# Patient Record
Sex: MALE | Race: BLACK OR AFRICAN AMERICAN | NOT HISPANIC OR LATINO | Employment: UNEMPLOYED | URBAN - METROPOLITAN AREA
[De-identification: names, ages, dates, MRNs, and addresses within clinical notes are randomized per-mention and may not be internally consistent; named-entity substitution may affect disease eponyms.]

---

## 2018-03-22 ENCOUNTER — HOSPITAL ENCOUNTER (EMERGENCY)
Facility: MEDICAL CENTER | Age: 38
End: 2018-03-22
Attending: EMERGENCY MEDICINE

## 2018-03-22 ENCOUNTER — APPOINTMENT (OUTPATIENT)
Dept: RADIOLOGY | Facility: MEDICAL CENTER | Age: 38
End: 2018-03-22
Attending: EMERGENCY MEDICINE

## 2018-03-22 VITALS
HEIGHT: 73 IN | SYSTOLIC BLOOD PRESSURE: 126 MMHG | TEMPERATURE: 97.3 F | DIASTOLIC BLOOD PRESSURE: 86 MMHG | OXYGEN SATURATION: 93 % | WEIGHT: 161.6 LBS | HEART RATE: 85 BPM | BODY MASS INDEX: 21.42 KG/M2 | RESPIRATION RATE: 18 BRPM

## 2018-03-22 DIAGNOSIS — J18.9 PNEUMONIA OF LEFT LOWER LOBE DUE TO INFECTIOUS ORGANISM: ICD-10-CM

## 2018-03-22 PROCEDURE — 71046 X-RAY EXAM CHEST 2 VIEWS: CPT

## 2018-03-22 PROCEDURE — 94760 N-INVAS EAR/PLS OXIMETRY 1: CPT

## 2018-03-22 PROCEDURE — 700101 HCHG RX REV CODE 250: Performed by: EMERGENCY MEDICINE

## 2018-03-22 PROCEDURE — 94640 AIRWAY INHALATION TREATMENT: CPT

## 2018-03-22 PROCEDURE — 99284 EMERGENCY DEPT VISIT MOD MDM: CPT

## 2018-03-22 RX ORDER — ALBUTEROL SULFATE 90 UG/1
2 AEROSOL, METERED RESPIRATORY (INHALATION) EVERY 6 HOURS PRN
Qty: 8.5 G | Refills: 0 | Status: SHIPPED | OUTPATIENT
Start: 2018-03-22

## 2018-03-22 RX ORDER — AZITHROMYCIN 250 MG/1
TABLET, FILM COATED ORAL
Qty: 6 TAB | Refills: 0 | Status: SHIPPED | OUTPATIENT
Start: 2018-03-22

## 2018-03-22 RX ORDER — PREDNISONE 20 MG/1
40 TABLET ORAL DAILY
Qty: 10 TAB | Refills: 0 | Status: SHIPPED | OUTPATIENT
Start: 2018-03-22 | End: 2018-03-27

## 2018-03-22 RX ADMIN — ALBUTEROL SULFATE 2.5 MG: 2.5 SOLUTION RESPIRATORY (INHALATION) at 20:11

## 2018-03-23 NOTE — ED PROVIDER NOTES
ED Provider Note     Scribed for Jessica Zuluaga M.D. by Duyen Joy. 3/22/2018, 7:20 PM.    Primary Care Provider: None noted  Means of arrival: Walk in  History obtained from: Patient  History limited by: None     CHIEF COMPLAINT  Chief Complaint   Patient presents with   • Cold Symptoms     x 2 weeks    • Cough     productive       HPI  Ivon Velasquez is a 38 y.o. male who presents to the Emergency Department for a lingering cough onset 2 weeks ago. He reports associated shortness of breath and generalized muscle aches. The patient states his cough is exacerbated at night. He has experienced one episode of post-tussive emesis 2 nights ago. The patient additionally endorses sinus pressure and a minor sinus headache. He states 2 weeks ago he also had generalized body aches, chills, and fever, however symptoms have resolved at this time. The patient has been taking Nyquil with no relief. He reports a prior history of pneumonia and bronchitis. The patient denies nausea, fever, or abdominal pain.     REVIEW OF SYSTEMS  Pertinent positives include cough, shortness of breath, generalized muscle aches, sinus pressure, headache, and post-tussive emesis. He does have impaired immunity secondary to cigarette use, he does have current tobacco abuse Pertinent negatives include no nausea, fever, abdominal pain, generalized body aches, chills, or fever. See HPI for further details.   E.    PAST MEDICAL HISTORY   has a past medical history of Bronchitis and Pneumonia.    SOCIAL HISTORY  Social History   Substance Use Topics   • Smoking status: Current Some Day Smoker     Types: Cigarettes   • Smokeless tobacco: Never Used   • Alcohol use Yes      Comment: daily      History   Drug Use   • Types: Inhaled     Comment: cannibis        SURGICAL HISTORY  patient denies any surgical history     CURRENT MEDICATIONS  Home Medications    **Home medications have not yet been reviewed for this encounter**         ALLERGIES  No  "Known Allergies    PHYSICAL EXAM  VITAL SIGNS: /79   Pulse (!) 104   Temp 36.3 °C (97.3 °F)   Resp 18   Ht 1.854 m (6' 1\")   Wt 73.3 kg (161 lb 9.6 oz)   SpO2 95%   BMI 21.32 kg/m²   Constitutional: Alert, no acute distress  HENT: Normocephalic, atraumatic, moist mucus membranes. Posterior oropharynx clear.   Eyes: Pupils equal and reactive, normal conjunctiva, non-icteric  Neck: Supple, normal range of motion, no stridor  Cardiovascular: Regular rhythm, Normal peripheral perfusion, no cyanosis, Normal cardiac auscultation  Pulmonary: Moderate diffuse wheezing throughout all lung fields. No accessory muscle usage.  Abdomen: Soft, non tender, no peritoneal signs, bowel sounds are present.   Skin: Warm, dry, no rashes or lesions  Back: No pain with active range of motion  Musculoskeletal: Normal range of motion in all extremities, no swelling or deformity noted  Neurologic: Alert, oriented, normal motor function, no speech deficits  Psychiatric: Normal and appropriate mood and affect    Radiology results show:   DX-CHEST-2 VIEWS   Final Result      Minimal and somewhat equivocal lingular opacities, suggesting early pneumonia in that area.               INTERPRETING LOCATION: [address] [place] [address], [facility]          COURSE & MEDICAL DECISION MAKING  Nursing notes, VS, PMSFHx reviewed in chart.    No recent medical records available for review    Differential diagnoses include but are not limited to: Pneumonia, reactive airway disease, post viral cough    7:20 PM - Patient seen and examined at bedside. Patient will be treated with Proventil 2.5 mg. Ordered DX-Chest to evaluate his symptoms.     Decision Making:  This is a 38 y.o. year old who presents with persisting cough after flulike illness. On arrival to the emergency department he is well appearing, room air oxygen saturation is 95%. On pulmonary exam he does have moderate wheezing throughout all lung fields. He is a daily smoker. He is afebrile on " arrival to the emergency department.    Symptoms are consistent with post viral cough. His wheezing was treated with albuterol in the emergency department. On reassessment wheezing has improved, though still present and mild. As his symptoms have been persistent, I did order a chest x-ray for further evaluation. This demonstrates possible early pneumonia in the lingula. Chronic changes present consistent with COPD.    Plan at this time is for treatment with azithromycin, albuterol inhaler as well as a short course of steroids due to his persistent wheezing and chronic changes on chest x-ray.    He will be discharged home with close primary care follow-up. He will contact his physician tomorrow morning for complete recheck. Return precautions given including worsening shortness of breath, development of fevers, persistent cough that does not improve with albuterol, or any further concerns. He should follow-up within 24-48 hours for breathing rechecked, with his primary care physician, emergency department, or urgent care.    Discharge home in stable condition    FINAL IMPRESSION  1. Pneumonia of left lower lobe due to infectious organism (CMS-[facility])          Duyen HENLEY (Bruce), am scribing for, and in the presence of, Jessica Zuluaga M.D..    Electronically signed by: Duyen Joy (Bruce), 3/22/2018    Jessica HENLEY M.D. personally performed the services described in this documentation, as scribed by Duyen Joy in my presence, and it is both accurate and complete.    The note accurately reflects work and decisions made by me.  Jessica Zuluaga  3/22/2018  8:28 PM

## 2018-03-23 NOTE — ED TRIAGE NOTES
Patient states productive cough x 2 weeks w/ fever/flu like symptoms including chills, body aches, and fatigue. Patient states productive cough w/ yellow/green sputum. Patient also states SOB at rest, intermittently worse at times.

## 2018-03-23 NOTE — ED NOTES
Patient dc'd to home w/ self. Patient alert and oriented x 4. Patient ambulatory w/ steady gait. Patient verbalizes understanding of discharge instructions, follow up care/gettig established w/ PCP, and prescriptions x 3. Patient denies questions upon discharge.

## 2018-03-23 NOTE — FLOWSHEET NOTE
03/22/18 2012   Events/Summary/Plan   Events/Summary/Plan SVN given in ED    Interdisciplinary Plan of Care-Goals (Indications)   Obstructive Ventilatory Defect or Pulmonary Disease without Obvious Obstruction Strong Subjective / Objective Improvement   Interdisciplinary Plan of Care-Outcomes    Bronchodilator Outcome Patient at Stable Baseline   Education   Education Yes - Pt. / Family has been Instructed in use of Respiratory Equipment;Yes - Pt. / Family has been Instructed in use of Respiratory Medications and Adverse Reactions   RT Assessment of Delivered Medications   Evaluation of Medication Delivery Daily Yes-- Pt /Family has been Instructed in use of Respiratory Medications and Adverse Reactions   SVN Group   #SVN Performed Yes   Given By: Mouthpiece   Date SVN Last Changed 03/22/18   Date SVN Next Change Due (Q 7 Days) 03/29/18   Respiratory WDL   Respiratory (WDL) X   Chest Exam   Work Of Breathing / Effort Mild   Respiration 18   Pulse 85   Breath Sounds   Pre/Post Intervention Pre Intervention Assessment  (mild aeration post treatment )   RUL Breath Sounds Clear   RML Breath Sounds Clear   RLL Breath Sounds Diminished   BALDOMERO Breath Sounds Clear   LLL Breath Sounds Diminished   Secretions   Cough Non Productive   Oximetry   #Pulse Oximetry (Single Determination) Yes   Oxygen   Home O2 Use Prior To Admission? No   Pulse Oximetry 93 %   O2 (LPM) 0   O2 (FiO2) 21   O2 Daily Delivery Respiratory  Room Air with O2 Available

## 2018-03-23 NOTE — ED TRIAGE NOTES
Ivon Velasquez  Chief Complaint   Patient presents with   • Cold Symptoms     x 2 weeks    • Cough     productive     Pt ambulatory to triage with above complaint.  VSS. Pt returned to lobby, educated on triage process, and to inform staff of any changes or concerns.